# Patient Record
Sex: MALE | Race: BLACK OR AFRICAN AMERICAN | NOT HISPANIC OR LATINO | URBAN - METROPOLITAN AREA
[De-identification: names, ages, dates, MRNs, and addresses within clinical notes are randomized per-mention and may not be internally consistent; named-entity substitution may affect disease eponyms.]

---

## 2022-11-08 ENCOUNTER — OFFICE VISIT (OUTPATIENT)
Dept: PODIATRY | Facility: CLINIC | Age: 52
End: 2022-11-08

## 2022-11-08 VITALS
SYSTOLIC BLOOD PRESSURE: 117 MMHG | HEART RATE: 87 BPM | DIASTOLIC BLOOD PRESSURE: 75 MMHG | BODY MASS INDEX: 28.17 KG/M2 | WEIGHT: 185.88 LBS | HEIGHT: 68 IN

## 2022-11-08 DIAGNOSIS — L60.9 DISEASE OF NAIL: Primary | ICD-10-CM

## 2022-11-08 DIAGNOSIS — R73.03 PREDIABETES: ICD-10-CM

## 2022-11-08 PROCEDURE — 87101 SKIN FUNGI CULTURE: CPT | Performed by: PODIATRIST

## 2022-11-08 PROCEDURE — 99999 PR PBB SHADOW E&M-NEW PATIENT-LVL III: ICD-10-PCS | Mod: PBBFAC,,, | Performed by: PODIATRIST

## 2022-11-08 PROCEDURE — 99203 OFFICE O/P NEW LOW 30 MIN: CPT | Mod: S$PBB,,, | Performed by: PODIATRIST

## 2022-11-08 PROCEDURE — 99203 PR OFFICE/OUTPT VISIT, NEW, LEVL III, 30-44 MIN: ICD-10-PCS | Mod: S$PBB,,, | Performed by: PODIATRIST

## 2022-11-08 PROCEDURE — 87107 FUNGI IDENTIFICATION MOLD: CPT | Performed by: PODIATRIST

## 2022-11-08 PROCEDURE — 99999 PR PBB SHADOW E&M-NEW PATIENT-LVL III: CPT | Mod: PBBFAC,,, | Performed by: PODIATRIST

## 2022-11-08 RX ORDER — CICLOPIROX 80 MG/ML
SOLUTION TOPICAL NIGHTLY
Qty: 6.6 ML | Refills: 1 | Status: SHIPPED | OUTPATIENT
Start: 2022-11-08

## 2022-11-08 NOTE — PROGRESS NOTES
Subjective:      Patient ID: Foster Grier is a 52 y.o. male.    Chief Complaint:   Diabetes Mellitus and Nail Problem (Left foot great toe/Pcp-none)    Foster is a 52 y.o. male who presents to the clinic complaining of thick and discolored toenails on the left foot. Foster is inquiring about treatment options.    Relates there is no pain he noticed some discoloration changes to the left big toenail about 3 or 4 months ago.  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  He wears steel toe shoes.  He denies any injury.  He denies any numbness tingling or pain.  Denies any smoking.    He does not have a primary care doctor.  He was told in the past when he did that he was prediabetic.       No past medical history on file.  No past surgical history on file.  No current outpatient medications on file prior to visit.     No current facility-administered medications on file prior to visit.     Review of patient's allergies indicates:  No Known Allergies    Review of Systems   Constitutional: Negative for chills, decreased appetite, fever, malaise/fatigue, night sweats, weight gain and weight loss.   Cardiovascular:  Negative for chest pain, claudication, dyspnea on exertion, leg swelling, palpitations and syncope.   Respiratory:  Negative for cough and shortness of breath.    Endocrine: Negative for cold intolerance and heat intolerance.   Hematologic/Lymphatic: Negative for bleeding problem. Does not bruise/bleed easily.   Skin:  Positive for nail changes. Negative for color change, dry skin, flushing, itching, poor wound healing, rash, skin cancer, suspicious lesions and unusual hair distribution.   Musculoskeletal:  Negative for arthritis, back pain, falls, gout, joint pain, joint swelling, muscle cramps, muscle weakness, myalgias, neck pain and stiffness.   Gastrointestinal:  Negative for diarrhea, nausea and vomiting.   Neurological:  Negative for dizziness, focal weakness, light-headedness, numbness, paresthesias,  "tremors, vertigo and weakness.   Psychiatric/Behavioral:  Negative for altered mental status and depression. The patient does not have insomnia.    Allergic/Immunologic: Negative.          Objective:       Vitals:    11/08/22 0737   BP: 117/75   Pulse: 87   Weight: 84.3 kg (185 lb 13.6 oz)   Height: 5' 8" (1.727 m)   PainSc: 0-No pain   84.3 kg (185 lb 13.6 oz)     Physical Exam  Vitals reviewed.   Constitutional:       General: He is not in acute distress.     Appearance: He is well-developed. He is not ill-appearing, toxic-appearing or diaphoretic.      Comments: Proper supportive shoegear      Cardiovascular:      Pulses:           Dorsalis pedis pulses are 2+ on the right side and 2+ on the left side.        Posterior tibial pulses are 1+ on the right side and 1+ on the left side.      Comments: Hair growth to digits  Musculoskeletal:         General: No swelling or deformity.      Right lower leg: No edema.      Left lower leg: No edema.      Right ankle: Normal.      Right Achilles Tendon: Normal.      Left ankle: Normal.      Left Achilles Tendon: Normal.      Right foot: Decreased range of motion. No deformity, tenderness or bony tenderness.      Left foot: Decreased range of motion. Tenderness present. No deformity or bony tenderness.      Comments: No pain on palpation mild pain with nail debridement   Feet:      Right foot:      Protective Sensation: 5 sites tested.  5 sites sensed.      Skin integrity: No ulcer, blister, skin breakdown, erythema, warmth, callus or dry skin.      Toenail Condition: Right toenails are normal.      Left foot:      Protective Sensation: 5 sites tested.  5 sites sensed.      Skin integrity: No ulcer, blister, skin breakdown, erythema, warmth, callus or dry skin.      Toenail Condition: Left toenails are abnormally thick. Fungal disease present.     Comments: No open lesions    Lateral border hallux nail is thickened discolored dystrophic appears mycotic no signs of " infection  Other nails are thinner and clear mild discoloration  Skin:     General: Skin is warm.      Capillary Refill: Capillary refill takes 2 to 3 seconds.      Coloration: Skin is not pale.      Findings: No erythema or rash.      Nails: There is no clubbing.   Neurological:      Mental Status: He is alert and oriented to person, place, and time.      Sensory: No sensory deficit.      Gait: Gait is intact.   Psychiatric:         Attention and Perception: Attention normal.         Mood and Affect: Mood normal.         Speech: Speech normal.         Behavior: Behavior normal.         Thought Content: Thought content normal.         Cognition and Memory: Cognition normal.         Judgment: Judgment normal.             Assessment:       Encounter Diagnoses   Name Primary?    Disease of nail Yes    Prediabetes          Plan:       Foster was seen today for diabetes mellitus and nail problem.    Diagnoses and all orders for this visit:    Disease of nail  -     Ambulatory referral/consult to Internal Medicine; Future  -     Fungal culture , skin, hair, or nails    Prediabetes  -     Ambulatory referral/consult to Internal Medicine; Future    Other orders  -     ciclopirox (PENLAC) 8 % Soln; Apply topically nightly. Remove once a week with nail polish remover    I counseled the patient on his conditions, their implications and medical management.    The area was cleansed with alcohol prep pad. With patient's permission, the affected toenail was  aggressively reduced back to the proximal matrix region using sterile nail nippers to the soft tissue attachment to obtain nail specimen. Patient tolerated well. No blood was drawn. The specimen was placed in a sterile specimen container and appropriately label with patient confirmation      Discuss treatment options for nail fungus.  I explained that fungus lives in a warm dark moist environment and therefore patient should make every attempt to keep feet clean and dry.  We  discussed drying feet thoroughly after shower particularly between the toes and then applying powder between the toes and in the shoes.   For fungal toenails I prescribed Penlac to be used daily for up to a year.  We discussed oral Lamisil but I did not recommend it as a first line of treatment since it is an internal medicine that may potentially have side effects, including liver problems. Patient elects for topical treatment. Patient instructed on proper use of Penlac.     Recommend patient seek a Internal Medicine primary care doctor.  Patient needs yearly lab work.  Patient needs to know if he is diabetic or not.  Unknown status.  Neurovascular status appears intact    Can consider oral Lamisil if hepatic labs or normal  Also can consider nail procedure if patient's A1c is within normal limits    Follow-up 3 or 4 months sooner if needed will call with nail biopsy results as patient does not have [facility]    .           Follow up in about 3 months (around 2/8/2023), or if symptoms worsen or fail to improve.

## 2022-11-18 ENCOUNTER — TELEPHONE (OUTPATIENT)
Dept: PODIATRY | Facility: CLINIC | Age: 52
End: 2022-11-18

## 2022-11-18 NOTE — TELEPHONE ENCOUNTER
----- Message from Heidi Cast DPM sent at 11/18/2022  8:25 AM CST -----  Please call patient and let him know that his nails sample came back positive for fungus.  I would like him to continue the Penlac topical.    I also would like to know if he has established a primary care doctor.  If not please let him know that I recommend establishing a primary care doctor so he can get lab work and we can further treat his feet through other options  Thank you   No

## 2022-12-16 LAB — FUNGUS BLD CULT: ABNORMAL

## 2022-12-19 ENCOUNTER — TELEPHONE (OUTPATIENT)
Dept: PODIATRY | Facility: CLINIC | Age: 52
End: 2022-12-19

## 2022-12-19 NOTE — TELEPHONE ENCOUNTER
Spoke with patient concerning test results.          Per Dr. Heidi Cast:  Please call patient again and let him know that his nails sample came back positive for fungus.  I would like him to continue the Penlac topical.       I also would like to know if he has established a primary care doctor.  If not please let him know that I recommend establishing a primary care doctor so he can get lab work and we can further treat his feet through other options   Thank you